# Patient Record
Sex: FEMALE | Race: WHITE | Employment: UNEMPLOYED | URBAN - METROPOLITAN AREA
[De-identification: names, ages, dates, MRNs, and addresses within clinical notes are randomized per-mention and may not be internally consistent; named-entity substitution may affect disease eponyms.]

---

## 2019-07-14 ENCOUNTER — APPOINTMENT (OUTPATIENT)
Dept: GENERAL RADIOLOGY | Age: 54
End: 2019-07-14
Payer: MEDICARE

## 2019-07-14 ENCOUNTER — HOSPITAL ENCOUNTER (EMERGENCY)
Age: 54
Discharge: HOME | End: 2019-07-14
Payer: MEDICARE

## 2019-07-14 VITALS
DIASTOLIC BLOOD PRESSURE: 76 MMHG | WEIGHT: 293 LBS | OXYGEN SATURATION: 95 % | HEIGHT: 67 IN | SYSTOLIC BLOOD PRESSURE: 136 MMHG | HEART RATE: 80 BPM | BODY MASS INDEX: 45.99 KG/M2 | RESPIRATION RATE: 16 BRPM | TEMPERATURE: 98.6 F

## 2019-07-14 DIAGNOSIS — M25.571 ACUTE RIGHT ANKLE PAIN: ICD-10-CM

## 2019-07-14 DIAGNOSIS — S96.911A RIGHT ANKLE STRAIN, INITIAL ENCOUNTER: Primary | ICD-10-CM

## 2019-07-14 PROCEDURE — 73610 X-RAY EXAM OF ANKLE: CPT

## 2019-07-14 PROCEDURE — 6370000000 HC RX 637 (ALT 250 FOR IP): Performed by: NURSE PRACTITIONER

## 2019-07-14 PROCEDURE — 99283 EMERGENCY DEPT VISIT LOW MDM: CPT

## 2019-07-14 RX ORDER — ZOLPIDEM TARTRATE 10 MG/1
TABLET ORAL NIGHTLY PRN
COMMUNITY

## 2019-07-14 RX ORDER — AMLODIPINE BESYLATE 10 MG/1
10 TABLET ORAL DAILY
COMMUNITY

## 2019-07-14 RX ORDER — SERTRALINE HYDROCHLORIDE 100 MG/1
200 TABLET, FILM COATED ORAL DAILY
COMMUNITY

## 2019-07-14 RX ORDER — TRAMADOL HYDROCHLORIDE 50 MG/1
50 TABLET ORAL EVERY 6 HOURS PRN
Qty: 15 TABLET | Refills: 0 | Status: SHIPPED | OUTPATIENT
Start: 2019-07-14 | End: 2019-07-18

## 2019-07-14 RX ORDER — FEXOFENADINE HCL 180 MG/1
180 TABLET ORAL DAILY
COMMUNITY

## 2019-07-14 RX ORDER — SPIRONOLACTONE 25 MG/1
25 TABLET ORAL DAILY
COMMUNITY

## 2019-07-14 RX ORDER — OMEPRAZOLE 20 MG/1
20 CAPSULE, DELAYED RELEASE ORAL DAILY
COMMUNITY

## 2019-07-14 RX ORDER — TRAMADOL HYDROCHLORIDE 50 MG/1
50 TABLET ORAL ONCE
Status: COMPLETED | OUTPATIENT
Start: 2019-07-14 | End: 2019-07-14

## 2019-07-14 RX ORDER — METOPROLOL TARTRATE 100 MG/1
200 TABLET ORAL 2 TIMES DAILY
COMMUNITY

## 2019-07-14 RX ORDER — MULTIVIT-MIN/IRON/FOLIC ACID/K 18-600-40
CAPSULE ORAL
COMMUNITY

## 2019-07-14 RX ADMIN — TRAMADOL HYDROCHLORIDE 50 MG: 50 TABLET, FILM COATED ORAL at 19:13

## 2019-07-14 ASSESSMENT — ENCOUNTER SYMPTOMS
BACK PAIN: 0
NAUSEA: 0
COUGH: 0
SHORTNESS OF BREATH: 0
DIARRHEA: 0
COLOR CHANGE: 0
ABDOMINAL PAIN: 0
VOMITING: 0

## 2019-07-14 ASSESSMENT — PAIN SCALES - GENERAL
PAINLEVEL_OUTOF10: 9
PAINLEVEL_OUTOF10: 9

## 2019-07-14 NOTE — ED PROVIDER NOTES
**[facility]  [facility]  EMERGENCY DEPARTMENT ENCOUNTER      Pt Name: Anamaria Herring  [facility]:[id number]  [place] 1965  Date of evaluation: 7/14/2019  Provider: WILLIS Urena CNP      Chief Complaint:    Chief Complaint   Patient presents with    Ankle Injury     right fell at pool today no LOC pt arrived via squad       Nursing Notes, Past Medical Hx, Past Surgical Hx, Social Hx, Allergies, and Family Hx were all reviewed and agreed with or any disagreements were addressed in the HPI.    HPI:  (Location, Duration, Timing, Severity,Quality, Assoc Sx, Context, Modifying factors)  This is a  49 y.o. female who presents emergency department status post fall, patient states she was at the pool today and slipped and fell twisting her right ankle. Patient arrived via EMS secondary to severity of right ankle pain. Rates the pain a 9 out of 10, states any movement worsens the pain. Denies any head neck or back pain, denies hitting her head or having loss of consciousness. She denies any additional injuries or complaints, came in by EMS, did not take any medicine prior to ED arrival.  No additional complaints, no additional aggravating or relieving factors. The patient presents awake, alert and in no acute distress or toxic appearance.     PastMedical/Surgical History:      Diagnosis Date    Arthritis     Diabetes mellitus ([address]     Hypertension     Sleep apnea          Procedure Laterality Date    CERVIX SURGERY      HYSTERECTOMY      partil    KNEE SURGERY      SINUS SURGERY         Medications:  Previous Medications    AMLODIPINE (NORVASC) 10 MG TABLET    Take 10 mg by mouth daily    CHOLECALCIFEROL (VITAMIN D) 2000 UNITS CAPS CAPSULE    Take by mouth    FEXOFENADINE (ALLEGRA) 180 MG TABLET    Take 180 mg by mouth daily    METFORMIN (GLUCOPHAGE) 1000 MG TABLET    Take 2,000 mg by mouth 2 times daily (with meals)    METOPROLOL (LOPRESSOR) 100 MG TABLET    Take 200 mg by mouth 2 times daily    OMEPRAZOLE (PRILOSEC) 20 MG DELAYED RELEASE CAPSULE    Take 20 mg by mouth daily    SERTRALINE (ZOLOFT) 100 MG TABLET    Take 200 mg by mouth daily    SPIRONOLACTONE (ALDACTONE) 25 MG TABLET    Take 25 mg by mouth daily    ZOLPIDEM (AMBIEN) 10 MG TABLET    Take by mouth nightly as needed for Sleep. Review of Systems:  Review of Systems   Constitutional: Negative for chills and fever. HENT: Negative for congestion. Respiratory: Negative for cough and shortness of breath. Cardiovascular: Negative for chest pain. Gastrointestinal: Negative for abdominal pain, diarrhea, nausea and vomiting. Musculoskeletal: Positive for arthralgias and joint swelling. Negative for back pain. Patient complains of right lateral malleoli pain and swelling, states that she injured it after slipping and falling at the pool   Skin: Negative for color change. Neurological: Negative for weakness, numbness and headaches. Denies any head neck or back pain, denies hitting her head or having loss of consciousness. Positives and Pertinent negatives as per HPI. Except as noted above in the ROS, problem specific ROS was completed and is negative. Physical Exam:  Physical Exam   Constitutional: She is oriented to person, place, and time. She appears well-developed and well-nourished. HENT:   Head: Normocephalic. Right Ear: External ear normal.   Left Ear: External ear normal.   Mouth/Throat: Oropharynx is clear and moist.   Eyes: Right eye exhibits no discharge. Left eye exhibits no discharge. No scleral icterus. Neck: Normal range of motion. Neck supple. Cardiovascular: Normal rate. Pulmonary/Chest: Effort normal and breath sounds normal. No respiratory distress. Abdominal: Soft. Musculoskeletal: Normal range of motion. Patient has obvious right lateral malleoli swelling and tenderness however there is no ecchymosis erythema or warmth.   No obvious deformity, break in skin integrity or skin tenting. She is able to flex and extend the right ankle however this does elicit pain. No palpable tenderness to the foot or arch. Compartments in the right leg are soft. Neurological: She is alert and oriented to person, place, and time. Skin: Skin is warm. Capillary refill takes less than 2 seconds. She is not diaphoretic. No pallor. Psychiatric: She has a normal mood and affect. Her behavior is normal.   Nursing note and vitals reviewed. MEDICAL DECISION MAKING    Vitals:    Vitals:    07/14/19 1803   BP: 136/76   Pulse: 80   Resp: 16   Temp: 98.6 °F (37 °C)   TempSrc: Oral   SpO2: 95%   Weight: 300 lb (136.1 kg)   Height: 5' 7\" (1.702 m)       LABS:Labs Reviewed - No data to display     Remainder of labs reviewed and werenegative at this time or not returned at the time of this note. RADIOLOGY:   Non-plain film images such as CT, Ultrasound and MRI are read by the radiologist. Christa CARABALLO APRN - CNP have directly visualized the radiologic plain film image(s) with the below findings:        Interpretation per the Radiologist below, if available at the time of thisnote:    XR ANKLE RIGHT (MIN 3 VIEWS)   Final Result   No acute osseous abnormality of the right ankle. Lateral soft tissue   swelling. Xr Ankle Right (min 3 Views)    Result Date: 7/14/2019  EXAMINATION: 3 XRAY VIEWS OF THE RIGHT ANKLE 7/14/2019 6:17 pm COMPARISON: None. HISTORY: ORDERING SYSTEM PROVIDED HISTORY: injury TECHNOLOGIST PROVIDED HISTORY: Reason for exam:->injury Reason for Exam: Ankle Injury (right fell at pool today no LOC pt arrived via squad) FINDINGS: No acute fracture or dislocation. The ankle mortise and talar dome are unremarkable. Mild soft tissue swelling laterally at the ankle. Prominent enthesopathy at the Achilles and plantar insertion on the calcaneus. No acute osseous abnormality of the right ankle. Lateral soft tissue swelling.

## 2019-07-14 NOTE — ED NOTES
Bed: 25  Expected date:   Expected time:   Means of arrival:   Comments:  m10     Riley Almaraz RN  07/14/19 9127